# Patient Record
Sex: FEMALE | Race: ASIAN | NOT HISPANIC OR LATINO | Employment: FULL TIME | URBAN - METROPOLITAN AREA
[De-identification: names, ages, dates, MRNs, and addresses within clinical notes are randomized per-mention and may not be internally consistent; named-entity substitution may affect disease eponyms.]

---

## 2020-09-03 ENCOUNTER — HOSPITAL ENCOUNTER (OUTPATIENT)
Facility: MEDICAL CENTER | Age: 45
End: 2020-09-03
Attending: DERMATOLOGY
Payer: COMMERCIAL

## 2020-09-03 PROCEDURE — 87205 SMEAR GRAM STAIN: CPT

## 2020-09-03 PROCEDURE — 87070 CULTURE OTHR SPECIMN AEROBIC: CPT

## 2020-09-04 LAB
AMBIGUOUS DTTM AMBI4: NORMAL
GRAM STN SPEC: NORMAL
SIGNIFICANT IND 70042: NORMAL
SIGNIFICANT IND 70042: NORMAL
SITE SITE: NORMAL
SITE SITE: NORMAL
SOURCE SOURCE: NORMAL
SOURCE SOURCE: NORMAL

## 2020-09-05 ENCOUNTER — HOSPITAL ENCOUNTER (OUTPATIENT)
Facility: MEDICAL CENTER | Age: 45
End: 2020-09-05
Attending: PHYSICIAN ASSISTANT
Payer: COMMERCIAL

## 2020-09-05 ENCOUNTER — OFFICE VISIT (OUTPATIENT)
Dept: URGENT CARE | Facility: PHYSICIAN GROUP | Age: 45
End: 2020-09-05
Payer: COMMERCIAL

## 2020-09-05 VITALS
TEMPERATURE: 98 F | HEART RATE: 66 BPM | DIASTOLIC BLOOD PRESSURE: 62 MMHG | RESPIRATION RATE: 20 BRPM | BODY MASS INDEX: 21.75 KG/M2 | OXYGEN SATURATION: 98 % | SYSTOLIC BLOOD PRESSURE: 110 MMHG | HEIGHT: 60 IN | WEIGHT: 110.8 LBS

## 2020-09-05 DIAGNOSIS — L03.113 CELLULITIS OF RIGHT UPPER EXTREMITY: ICD-10-CM

## 2020-09-05 DIAGNOSIS — L03.114 CELLULITIS OF LEFT UPPER EXTREMITY: ICD-10-CM

## 2020-09-05 DIAGNOSIS — L30.9 DERMATITIS: Primary | ICD-10-CM

## 2020-09-05 PROCEDURE — 87205 SMEAR GRAM STAIN: CPT | Mod: 91

## 2020-09-05 PROCEDURE — 87070 CULTURE OTHR SPECIMN AEROBIC: CPT | Mod: 91

## 2020-09-05 PROCEDURE — 99204 OFFICE O/P NEW MOD 45 MIN: CPT | Performed by: PHYSICIAN ASSISTANT

## 2020-09-05 RX ORDER — CLINDAMYCIN PHOSPHATE 10 UG/ML
LOTION TOPICAL
COMMUNITY
Start: 2020-09-03

## 2020-09-05 RX ORDER — AMOXICILLIN 500 MG/1
500 CAPSULE ORAL 4 TIMES DAILY
Qty: 40 CAP | Refills: 0 | Status: SHIPPED | OUTPATIENT
Start: 2020-09-05 | End: 2020-09-09

## 2020-09-05 ASSESSMENT — ENCOUNTER SYMPTOMS
FEVER: 0
NAUSEA: 0
ABDOMINAL PAIN: 0
VOMITING: 0
CHILLS: 0
COUGH: 0
SHORTNESS OF BREATH: 0
CONSTIPATION: 0
DIARRHEA: 0

## 2020-09-05 NOTE — PATIENT INSTRUCTIONS
Cellulitis, Adult    Cellulitis is a skin infection. The infected area is usually warm, red, swollen, and tender. This condition occurs most often in the arms and lower legs. The infection can travel to the muscles, blood, and underlying tissue and become serious. It is very important to get treated for this condition.  What are the causes?  Cellulitis is caused by bacteria. The bacteria enter through a break in the skin, such as a cut, burn, insect bite, open sore, or crack.  What increases the risk?  This condition is more likely to occur in people who:  · Have a weak body defense system (immune system).  · Have open wounds on the skin, such as cuts, burns, bites, and scrapes. Bacteria can enter the body through these open wounds.  · Are older than 60 years of age.  · Have diabetes.  · Have a type of long-lasting (chronic) liver disease (cirrhosis) or kidney disease.  · Are obese.  · Have a skin condition such as:  ? Itchy rash (eczema).  ? Slow movement of blood in the veins (venous stasis).  ? Fluid buildup below the skin (edema).  · Have had radiation therapy.  · Use IV drugs.  What are the signs or symptoms?  Symptoms of this condition include:  · Redness, streaking, or spotting on the skin.  · Swollen area of the skin.  · Tenderness or pain when an area of the skin is touched.  · Warm skin.  · A fever.  · Chills.  · Blisters.  How is this diagnosed?  This condition is diagnosed based on a medical history and physical exam. You may also have tests, including:  · Blood tests.  · Imaging tests.  How is this treated?  Treatment for this condition may include:  · Medicines, such as antibiotic medicines or medicines to treat allergies (antihistamines).  · Supportive care, such as rest and application of cold or warm cloths (compresses) to the skin.  · Hospital care, if the condition is severe.  The infection usually starts to get better within 1-2 days of treatment.  Follow these instructions at  home:    Medicines  · Take over-the-counter and prescription medicines only as told by your health care provider.  · If you were prescribed an antibiotic medicine, take it as told by your health care provider. Do not stop taking the antibiotic even if you start to feel better.  General instructions  · Drink enough fluid to keep your urine pale yellow.  · Do not touch or rub the infected area.  · Raise (elevate) the infected area above the level of your heart while you are sitting or lying down.  · Apply warm or cold compresses to the affected area as told by your health care provider.  · Keep all follow-up visits as told by your health care provider. This is important. These visits let your health care provider make sure a more serious infection is not developing.  Contact a health care provider if:  · You have a fever.  · Your symptoms do not begin to improve within 1-2 days of starting treatment.  · Your bone or joint underneath the infected area becomes painful after the skin has healed.  · Your infection returns in the same area or another area.  · You notice a swollen bump in the infected area.  · You develop new symptoms.  · You have a general ill feeling (malaise) with muscle aches and pains.  Get help right away if:  · Your symptoms get worse.  · You feel very sleepy.  · You develop vomiting or diarrhea that persists.  · You notice red streaks coming from the infected area.  · Your red area gets larger or turns dark in color.  These symptoms may represent a serious problem that is an emergency. Do not wait to see if the symptoms will go away. Get medical help right away. Call your local emergency services (911 in the [place] Do not drive yourself to the hospital.  Summary  · Cellulitis is a skin infection. This condition occurs most often in the arms and lower legs.  · Treatment for this condition may include medicines, such as antibiotic medicines or antihistamines.  · Take over-the-counter and prescription  medicines only as told by your health care provider. If you were prescribed an antibiotic medicine, do not stop taking the antibiotic even if you start to feel better.  · Contact a health care provider if your symptoms do not begin to improve within 1-2 days of starting treatment or your symptoms get worse.  · Keep all follow-up visits as told by your health care provider. This is important. These visits let your health care provider make sure that a more serious infection is not developing.  This information is not intended to replace advice given to you by your health care provider. Make sure you discuss any questions you have with your health care provider.  Document Released: 09/27/2006 Document Revised: 05/09/2019 Document Reviewed: 05/09/2019  Elsevier Patient Education © 2020 [employer]

## 2020-09-05 NOTE — PROGRESS NOTES
Subjective:   Estela Valencia is a 44 y.o. female who presents for Rash (dermititis, both arms (R) upper arm. reoocured in august )        HPI  The patient presents to urgent care for recurrent dermatitis.  She was originally treated for eczema in July by her dermatologist.  She has been applying topical triamcinolone intermittently for inflammation secondary to dry skin from washing hands.  In August she had a secondary infection due to dermatitis and was put on a course of amoxicillin 5-day course and topical mupirocin with improvement.  The infection recurred last week.  She has been applying topical mupirocin without relief.  She feels as if infection never truly resolved.  She would like area cultured today.  She has discontinued topical steroid when the area showed signs of infection.      Review of Systems   Constitutional: Negative for chills, fever and malaise/fatigue.   Respiratory: Negative for cough and shortness of breath.    Gastrointestinal: Negative for abdominal pain, constipation, diarrhea, nausea and vomiting.   Skin: Positive for itching and rash.   All other systems reviewed and are negative.      PMH:  has no past medical history on file.  MEDS:   Current Outpatient Medications:   •  CLINDAMYCIN PHOSPHATE,TOPICAL, 1 % Lotion, , Disp: , Rfl:   •  amoxicillin (AMOXIL) 500 MG Cap, Take 1 Cap by mouth 4 times a day for 10 days., Disp: 40 Cap, Rfl: 0  ALLERGIES: No Known Allergies  SURGHX: History reviewed. No pertinent surgical history.  SOCHX:  reports that she has never smoked. She has never used smokeless tobacco.  History reviewed. No pertinent family history.     Objective:   /62 (BP Location: Left arm, Patient Position: Sitting, BP Cuff Size: Adult)   Pulse 66   Temp 36.7 °C (98 °F) (Temporal)   Resp 20   Ht 1.524 m (5')   Wt 50.3 kg (110 lb 12.8 oz)   SpO2 98%   BMI 21.64 kg/m²     Physical Exam  Vitals signs reviewed.   Constitutional:       General: She is not in acute distress.      Appearance: She is well-developed.   HENT:      Head: Normocephalic and atraumatic.      Right Ear: External ear normal.      Left Ear: External ear normal.      Nose: Nose normal.      Mouth/Throat:      Mouth: Mucous membranes are moist.   Eyes:      Conjunctiva/sclera: Conjunctivae normal.      Pupils: Pupils are equal, round, and reactive to light.   Neck:      Musculoskeletal: Normal range of motion and neck supple.      Trachea: No tracheal deviation.   Cardiovascular:      Rate and Rhythm: Normal rate and regular rhythm.   Pulmonary:      Effort: Pulmonary effort is normal.      Breath sounds: Normal breath sounds.   Skin:     General: Skin is warm and dry.      Capillary Refill: Capillary refill takes less than 2 seconds.          Neurological:      General: No focal deficit present.      Mental Status: She is alert and oriented to person, place, and time.   Psychiatric:         Mood and Affect: Mood normal.         Behavior: Behavior normal.           Assessment/Plan:     1. Dermatitis  REFERRAL TO FOLLOW-UP WITH PRIMARY CARE   2. Cellulitis of right upper extremity  amoxicillin (AMOXIL) 500 MG Cap    CULTURE WOUND W/ GRAM STAIN   3. Cellulitis of left upper extremity  CULTURE WOUND W/ GRAM STAIN     Supportive care reviewed.  Continue applying mupirocin ointment.  Monitor area for increased redness, discharge.  Follow-up with dermatologist as scheduled in 2 weeks.  Patient will be notified of final culture results.  She will be treated empirically with amoxicillin.    Follow-up/establish with primary care provider within 7-10 days, referral placed.  If symptoms worsen or persist patient can return to clinic for reevaluation. All side effects of medication discussed including allergic response, GI upset, tendon injury, etc. Patient confirmed understanding of information.    Please note that this dictation was created using voice recognition software. I have made every reasonable attempt to correct obvious  errors, but I expect that there are errors of grammar and possibly content that I did not discover before finalizing the note.

## 2020-09-06 DIAGNOSIS — L03.114 CELLULITIS OF LEFT UPPER EXTREMITY: ICD-10-CM

## 2020-09-06 DIAGNOSIS — L03.113 CELLULITIS OF RIGHT UPPER EXTREMITY: ICD-10-CM

## 2020-09-07 LAB
BACTERIA WND AEROBE CULT: NORMAL
GRAM STN SPEC: NORMAL
SIGNIFICANT IND 70042: NORMAL
SITE SITE: NORMAL
SOURCE SOURCE: NORMAL

## 2020-09-08 ENCOUNTER — TELEPHONE (OUTPATIENT)
Dept: SCHEDULING | Facility: IMAGING CENTER | Age: 45
End: 2020-09-08

## 2020-09-09 ENCOUNTER — OFFICE VISIT (OUTPATIENT)
Dept: MEDICAL GROUP | Facility: LAB | Age: 45
End: 2020-09-09
Payer: COMMERCIAL

## 2020-09-09 VITALS
BODY MASS INDEX: 21.6 KG/M2 | OXYGEN SATURATION: 96 % | RESPIRATION RATE: 14 BRPM | HEIGHT: 60 IN | TEMPERATURE: 99 F | WEIGHT: 110 LBS | HEART RATE: 86 BPM

## 2020-09-09 DIAGNOSIS — L03.90 CELLULITIS, UNSPECIFIED CELLULITIS SITE: ICD-10-CM

## 2020-09-09 PROCEDURE — 99213 OFFICE O/P EST LOW 20 MIN: CPT | Performed by: PHYSICIAN ASSISTANT

## 2020-09-09 RX ORDER — METHYLPREDNISOLONE 4 MG/1
TABLET ORAL
Qty: 21 TAB | Refills: 0 | Status: SHIPPED | OUTPATIENT
Start: 2020-09-09

## 2020-09-09 RX ORDER — SULFAMETHOXAZOLE AND TRIMETHOPRIM 800; 160 MG/1; MG/1
1 TABLET ORAL 2 TIMES DAILY
Qty: 14 TAB | Refills: 0 | Status: SHIPPED | OUTPATIENT
Start: 2020-09-09 | End: 2020-09-16

## 2020-09-09 ASSESSMENT — PATIENT HEALTH QUESTIONNAIRE - PHQ9: CLINICAL INTERPRETATION OF PHQ2 SCORE: 0

## 2020-09-09 NOTE — ASSESSMENT & PLAN NOTE
Pt presents today c/o worsening rash on b/l UE and left lower extremity.   Was seen previously by Derm 08/2020 for contact Dermatitis. Was started on Clobetasol and mupirocion at that time without relief. Was then seen 09/05/2020 at  - started on Amoxil. She has been on this x4 days without relief.     Originally in the garden months ago was washing hands vigorously due to COVID concerns.   No fevers/chills.   Denies body or muscle aches.   No recent travel.

## 2020-09-09 NOTE — PROGRESS NOTES
Subjective:   Estela Valencia is a 44 y.o. female here today to establish care with new provider and for worsening rash.     Cellulitis  Pt presents today c/o worsening rash on b/l UE and left lower extremity.   Was seen previously by Derm 08/2020 for contact Dermatitis. Was started on Clobetasol and mupirocion at that time without relief. Was then seen 09/05/2020 at  - started on Amoxil. She has been on this x4 days without relief.     Originally in the garden months ago was washing hands vigorously due to COVID concerns.   No fevers/chills.   Denies body or muscle aches.   No recent travel.      Current medicines (including changes today)  Current Outpatient Medications   Medication Sig Dispense Refill   • sulfamethoxazole-trimethoprim (BACTRIM DS) 800-160 MG tablet Take 1 Tab by mouth 2 times a day for 7 days. 14 Tab 0   • methylPREDNISolone (MEDROL DOSEPAK) 4 MG Tablet Therapy Pack As directed on the packaging label. 21 Tab 0   • CLINDAMYCIN PHOSPHATE,TOPICAL, 1 % Lotion        No current facility-administered medications for this visit.      She  has no past medical history on file.    ROS   No chest pain, no shortness of breath, no abdominal pain  +Rash   - Fevers/chill         Objective:     Pulse 86   Temp 37.2 °C (99 °F)   Resp 14   Ht 1.524 m (5')   Wt 49.9 kg (110 lb)   SpO2 96%  Body mass index is 21.48 kg/m².   Physical Exam:  Constitutional: Alert, no distress.  Skin: Warm, dry, good turgor, large areas of erythema located on R upper arm, b/l lower arms at flexor surfaces as well as posterior left lower leg near the left popliteal fossa. No purulent material is present. These areas are hot the the touch.   Eye: Equal, roundconjunctiva clear, lids normal.  Respiratory: Unlabored respiratory effort  Psych: Alert and oriented x3, normal affect and mood.        Assessment and Plan:   The following treatment plan was discussed    1. Cellulitis, unspecified cellulitis site  New problem to me  D/C Amoxil  "today  Start Bactrim and Medrol Jose.  She is reluctant to start oral steroids and feels \"clobetasol is enough.\" I strongly recommend using oral steroids since the rash is worsening and so wide spread.   Pt was also persistently recommending other antibiotics that she had researched online.   Cultures completed in UC were negative for growth   Continue to monitor,  UC/ED precautions if symptoms acutely worsen.   - sulfamethoxazole-trimethoprim (BACTRIM DS) 800-160 MG tablet; Take 1 Tab by mouth 2 times a day for 7 days.  Dispense: 14 Tab; Refill: 0  - REFERRAL TO DERMATOLOGY  - methylPREDNISolone (MEDROL DOSEPAK) 4 MG Tablet Therapy Pack; As directed on the packaging label.  Dispense: 21 Tab; Refill: 0        Followup: Return if symptoms worsen or fail to improve.       Maria E Calvin P.A.-C.  Supervising MD: Dr. Rufina Wright MD  09/09/20      "

## 2020-09-10 RX ORDER — CLINDAMYCIN PHOSPHATE 10 UG/ML
LOTION TOPICAL
Qty: 60 ML | Refills: 2 | OUTPATIENT
Start: 2020-09-10

## 2020-09-10 NOTE — TELEPHONE ENCOUNTER
Pt was seen yesterday and is out of this medication. She stated one bottle does not last but a day

## 2020-09-10 NOTE — TELEPHONE ENCOUNTER
Spoke with pt she believes she needs a topical as well. I did inform her of your instructions and she is insisting she needs a topical.   Please advise

## 2020-09-10 NOTE — TELEPHONE ENCOUNTER
Please notify that she does NOT need this on top of the oral antibiotics and oral steroids. She can use OTC fragrance free lotion (Eurcerin, Aveeno). Excessive use of topical antibiotics can cause worsening irritation to the skin - which we are trying to avoid.